# Patient Record
Sex: MALE | Race: WHITE | NOT HISPANIC OR LATINO
[De-identification: names, ages, dates, MRNs, and addresses within clinical notes are randomized per-mention and may not be internally consistent; named-entity substitution may affect disease eponyms.]

---

## 2022-06-24 ENCOUNTER — APPOINTMENT (OUTPATIENT)
Dept: ORTHOPEDIC SURGERY | Facility: CLINIC | Age: 77
End: 2022-06-24

## 2022-06-24 VITALS — WEIGHT: 193 LBS | BODY MASS INDEX: 25.3 KG/M2 | HEIGHT: 73.25 IN

## 2022-06-24 DIAGNOSIS — M70.51 OTHER BURSITIS OF KNEE, RIGHT KNEE: ICD-10-CM

## 2022-06-24 PROCEDURE — 20611 DRAIN/INJ JOINT/BURSA W/US: CPT | Mod: 50

## 2022-06-24 PROCEDURE — 99214 OFFICE O/P EST MOD 30 MIN: CPT | Mod: 25

## 2022-06-24 NOTE — HISTORY OF PRESENT ILLNESS
[Gradual] : gradual [Localized] : localized [Tightness] : tightness [Rest] : rest [Retired] : Work status: retired [de-identified] : has known OA and did well with euflexxxa, no injury, uses OTCs, did more house chores and has R knee medial pain, no swelling [] : no [FreeTextEntry1] : knees [FreeTextEntry5] : left knee is a 2 level of pain\par rt knee is a 5 or 6 level of pain [de-identified] : certain motions  [de-identified] :  [de-identified] : pt would like to start off with cortiosne then gel injections

## 2022-06-24 NOTE — DISCUSSION/SUMMARY
[de-identified] : Patient allowed to gently start resuming activities.\par Discussed change to medication prescription and usage. \par Offered cortisone steroid injection. \par Bracing options discussed with patient. \par Hyaluronic Acid inj pamphlet given to pt.\par

## 2022-07-15 ENCOUNTER — APPOINTMENT (OUTPATIENT)
Dept: ORTHOPEDIC SURGERY | Facility: CLINIC | Age: 77
End: 2022-07-15

## 2022-07-15 VITALS — HEIGHT: 73.25 IN | BODY MASS INDEX: 25.3 KG/M2 | WEIGHT: 193 LBS

## 2022-07-15 DIAGNOSIS — Z78.9 OTHER SPECIFIED HEALTH STATUS: ICD-10-CM

## 2022-07-15 DIAGNOSIS — M19.90 UNSPECIFIED OSTEOARTHRITIS, UNSPECIFIED SITE: ICD-10-CM

## 2022-07-15 PROCEDURE — 20611 DRAIN/INJ JOINT/BURSA W/US: CPT | Mod: 50

## 2022-07-15 PROCEDURE — 99212 OFFICE O/P EST SF 10 MIN: CPT | Mod: 25

## 2022-07-15 NOTE — DISCUSSION/SUMMARY
[de-identified] : Patient allowed to gently start resuming activities.\par Discussed change to medication prescription and usage. \par Offered cortisone steroid injection. \par Bracing options discussed with patient. \par Hyaluronic Acid inj pamphlet given to pt.\par

## 2022-07-15 NOTE — PROCEDURE
[FreeTextEntry3] : Euflexxa (Large Joint) with Ultrasound Guidance\par Viscosupplementation Injection: X-ray evidence of Osteoarthritis on this or prior visit and Patient has tried OTC's including aspirin, Ibuprofen, Aleve etc or prescription NSAIDS, and/or exercises at home and/ or physical therapy without satisfactory response. \par An injection of Euflexxa 2ml _#_1_ was injected into the bilateral knee(s). after verbal consent using sterile technique. The risks, benefits, and alternatives to Viscosupplementation injection were explained in full to the patient. Risks outlined include but are not limited to infection, sepsis, bleeding, scarring, skin discoloration, temporary increase in pain, syncopal episode, failure to resolve symptoms, allergic reaction, and symptom recurrence. Signs and symptoms of infection reviewed and patient advised to call immediately for redness, fevers, and/or chills. Patient understood the risks. All questions were answered. After discussion of options, patient requested Viscosupplementation. Oral informed consent was obtained and sterile prep was done of the injection site. Sterile technique was used without complications. The patient tolerated the procedure well. Ice tonight to the injection site. \par \par Ultrasound Guidance was used for the following reasons: altered anatomic landmarks because of erosive arthritis. \par \par Ultrasound guided injection was performed of the knee, visualization of the needle and placement of injection was performed without complication.

## 2022-07-15 NOTE — HISTORY OF PRESENT ILLNESS
[Gradual] : gradual [Rest] : rest [Retired] : Work status: retired [4] : 4 [Burning] : burning [Radiating] : radiating [de-identified] : Patient returns for Euflexxa #1 bilateral knees, has completed in the past with help. [] : no [FreeTextEntry1] : knees [FreeTextEntry6] : soreness [FreeTextEntry7] : l hip/groin [FreeTextEntry9] : Zilretta [de-identified] : certain motions  [de-identified] :

## 2022-07-19 ENCOUNTER — FORM ENCOUNTER (OUTPATIENT)
Age: 77
End: 2022-07-19

## 2022-07-20 ENCOUNTER — APPOINTMENT (OUTPATIENT)
Dept: CT IMAGING | Facility: CLINIC | Age: 77
End: 2022-07-20

## 2022-07-20 ENCOUNTER — APPOINTMENT (OUTPATIENT)
Dept: ORTHOPEDIC SURGERY | Facility: CLINIC | Age: 77
End: 2022-07-20

## 2022-07-20 VITALS — WEIGHT: 193 LBS | BODY MASS INDEX: 25.58 KG/M2 | HEIGHT: 73 IN

## 2022-07-20 PROCEDURE — 73700 CT LOWER EXTREMITY W/O DYE: CPT | Mod: LT,MH

## 2022-07-20 PROCEDURE — 76376 3D RENDER W/INTRP POSTPROCES: CPT | Mod: LT

## 2022-07-20 PROCEDURE — 73502 X-RAY EXAM HIP UNI 2-3 VIEWS: CPT

## 2022-07-20 PROCEDURE — 99214 OFFICE O/P EST MOD 30 MIN: CPT

## 2022-07-20 NOTE — ASSESSMENT
[FreeTextEntry1] : 76M p/w L painful SALINA\par \par f/u CT hip\par f/u esr crp cbc\par return after imaging\par \par The patient was advised of the diagnosis. The natural history of the pathology was explained in full to the patient in layman's terms. All questions were answered. The risks and benefits of surgical and non-surgical treatment alternatives were explained in full to the patient. \par

## 2022-07-20 NOTE — HISTORY OF PRESENT ILLNESS
[4] : 4 [3] : 3 [Dull/Aching] : dull/aching [Localized] : localized [Rest] : rest [Walking] : walking [Exercising] : exercising [Stairs] : stairs [de-identified] : 77 y/o M presenting for left hip pain X 3 weeks. History of femur/pelvic fracture in 2015 that required surgical fixation/SALINA at [facility]. Has pain into groin. Has not taken anything for the pain. Not doing PT for the hip at this time. States was working the yard  when the pain began. Most of the pain is contained to the groin.  [] : Post Surgical Visit: no [FreeTextEntry1] : left hip [FreeTextEntry5] : Left hip pain for the past ~4 weeks. history of left SALINA done Oct 2015 [FreeTextEntry6] : groin and outer hip [de-identified] : Oct 2015

## 2022-07-20 NOTE — IMAGING
[de-identified] : LEFT HIP\par Range of Motion\par Hip: Flexion/extension/ER/IR     / EX 20/ ER 45/ IR 20 / AB 60 /AD 20\par Impingement with flexion adduction and internal rotation: Positive\par Contracture: none\par Snapping hip: negative\par Greater trochanter: no tenderness\par \par Neurovascular\par Distal extremities: warm to touch\par Sensation to light touch: intact\par Muscle strength: 5/5\par  [Left] : left hip with pelvis [All Views] : anteroposterior, lateral [No loss of surgical correlation. Bony alignment acceptable. Hardware in appropriate position] : No loss of surgical correlation. Bony alignment acceptable. Hardware in appropriate position

## 2022-07-22 ENCOUNTER — APPOINTMENT (OUTPATIENT)
Dept: ORTHOPEDIC SURGERY | Facility: CLINIC | Age: 77
End: 2022-07-22

## 2022-07-22 VITALS — HEIGHT: 73 IN | WEIGHT: 193 LBS | BODY MASS INDEX: 25.58 KG/M2

## 2022-07-22 PROCEDURE — 99024 POSTOP FOLLOW-UP VISIT: CPT

## 2022-07-22 PROCEDURE — 20611 DRAIN/INJ JOINT/BURSA W/US: CPT | Mod: 50

## 2022-07-22 NOTE — HISTORY OF PRESENT ILLNESS
[Gradual] : gradual [4] : 4 [Burning] : burning [Radiating] : radiating [Rest] : rest [Retired] : Work status: retired [2] : 2 [Euflexxa] : Euflexxa [de-identified] : Patient returns for Euflexxa #2 bilateral knees, has completed in the past with help. [] : no [FreeTextEntry1] : knees [FreeTextEntry6] : soreness [FreeTextEntry7] : l hip/groin [FreeTextEntry9] : Zilretta [de-identified] : certain motions  [de-identified] :  [de-identified] : 7-15-22 [de-identified] : knees

## 2022-07-22 NOTE — HISTORY OF PRESENT ILLNESS
[Gradual] : gradual [4] : 4 [Burning] : burning [Radiating] : radiating [Rest] : rest [Retired] : Work status: retired [2] : 2 [Euflexxa] : Euflexxa [de-identified] : Patient returns for Euflexxa #2 bilateral knees, has completed in the past with help. [] : no [FreeTextEntry1] : knees [FreeTextEntry6] : soreness [FreeTextEntry7] : l hip/groin [FreeTextEntry9] : Zilretta [de-identified] : certain motions  [de-identified] :  [de-identified] : 7-15-22 [de-identified] : knees

## 2022-07-22 NOTE — PROCEDURE
[FreeTextEntry3] : Euflexxa (Large Joint) with Ultrasound Guidance\par Viscosupplementation Injection: X-ray evidence of Osteoarthritis on this or prior visit and Patient has tried OTC's including aspirin, Ibuprofen, Aleve etc or prescription NSAIDS, and/or exercises at home and/ or physical therapy without satisfactory response. \par An injection of Euflexxa 2ml _#_2_ was injected into the bilateral knee(s). after verbal consent using sterile technique. The risks, benefits, and alternatives to Viscosupplementation injection were explained in full to the patient. Risks outlined include but are not limited to infection, sepsis, bleeding, scarring, skin discoloration, temporary increase in pain, syncopal episode, failure to resolve symptoms, allergic reaction, and symptom recurrence. Signs and symptoms of infection reviewed and patient advised to call immediately for redness, fevers, and/or chills. Patient understood the risks. All questions were answered. After discussion of options, patient requested Viscosupplementation. Oral informed consent was obtained and sterile prep was done of the injection site. Sterile technique was used without complications. The patient tolerated the procedure well. Ice tonight to the injection site. \par \par Ultrasound Guidance was used for the following reasons: altered anatomic landmarks because of erosive arthritis. \par \par Ultrasound guided injection was performed of the knee, visualization of the needle and placement of injection was performed without complication.

## 2022-07-28 ENCOUNTER — APPOINTMENT (OUTPATIENT)
Dept: ORTHOPEDIC SURGERY | Facility: CLINIC | Age: 77
End: 2022-07-28

## 2022-07-28 VITALS — BODY MASS INDEX: 25.58 KG/M2 | HEIGHT: 73 IN | WEIGHT: 193 LBS

## 2022-07-28 PROCEDURE — 99214 OFFICE O/P EST MOD 30 MIN: CPT

## 2022-07-28 NOTE — HISTORY OF PRESENT ILLNESS
[4] : 4 [3] : 3 [Dull/Aching] : dull/aching [Localized] : localized [Rest] : rest [Walking] : walking [Exercising] : exercising [Stairs] : stairs [de-identified] : 7/28/22: f/u L hip CT, symptoms about the same\par \par 75 y/o M presenting for left hip pain X 3 weeks. History of femur/pelvic fracture in 2015 that required surgical fixation/SALINA at [facility]. Has pain into groin. Has not taken anything for the pain. Not doing PT for the hip at this time. States was working the yard  when the pain began. Most of the pain is contained to the groin.  [] : Post Surgical Visit: no [FreeTextEntry1] : left hip [FreeTextEntry5] : Left hip pain for the past ~4 weeks. history of left SALINA done Oct 2015 [FreeTextEntry6] : groin and outer hip [de-identified] : Oct 2015

## 2022-07-28 NOTE — IMAGING
[de-identified] : LEFT HIP\par Range of Motion\par Hip: Flexion/extension/ER/IR     / EX 20/ ER 45/ IR 20 / AB 60 /AD 20\par Impingement with flexion adduction and internal rotation: Positive\par Contracture: none\par Snapping hip: negative\par Greater trochanter: no tenderness\par \par Neurovascular\par Distal extremities: warm to touch\par Sensation to light touch: intact\par Muscle strength: 5/5\par

## 2022-07-28 NOTE — ASSESSMENT
[FreeTextEntry1] : 76M p/w L iliopsoas bursitis\par \par f/u L psoas bursa aspiration/injection\par return to office afterwards\par \par The patient was advised of the diagnosis. The natural history of the pathology was explained in full to the patient in layman's terms. All questions were answered. The risks and benefits of surgical and non-surgical treatment alternatives were explained in full to the patient. \par

## 2022-07-29 ENCOUNTER — APPOINTMENT (OUTPATIENT)
Dept: ORTHOPEDIC SURGERY | Facility: CLINIC | Age: 77
End: 2022-07-29

## 2022-07-29 PROCEDURE — 20611 DRAIN/INJ JOINT/BURSA W/US: CPT | Mod: LT

## 2022-07-29 PROCEDURE — 99024 POSTOP FOLLOW-UP VISIT: CPT

## 2022-07-29 NOTE — PROCEDURE
[FreeTextEntry3] : Euflexxa (Large Joint) with Ultrasound Guidance\par Viscosupplementation Injection: X-ray evidence of Osteoarthritis on this or prior visit and Patient has tried OTC's including aspirin, Ibuprofen, Aleve etc or prescription NSAIDS, and/or exercises at home and/ or physical therapy without satisfactory response. \par An injection of Euflexxa 2ml #3 was injected into the bilat knee(s). after verbal consent using sterile technique. The risks, benefits, and alternatives to Viscosupplementation injection were explained in full to the patient. Risks outlined include but are not limited to infection, sepsis, bleeding, scarring, skin discoloration, temporary increase in pain, syncopal episode, failure to resolve symptoms, allergic reaction, and symptom recurrence. Signs and symptoms of infection reviewed and patient advised to call immediately for redness, fevers, and/or chills. Patient understood the risks. All questions were answered. After discussion of options, patient requested Viscosupplementation. Oral informed consent was obtained and sterile prep was done of the injection site. Sterile technique was used without complications. The patient tolerated the procedure well. Ice tonight to the injection site. \par \par Ultrasound Guidance was used for the following reasons: altered anatomic landmarks because of erosive arthritis. \par \par Ultrasound guided injection was performed of the knee, visualization of the needle and placement of injection was performed without complication

## 2022-07-29 NOTE — HISTORY OF PRESENT ILLNESS
[de-identified] : Patient returns for Euflexxa 3 bilateral knees, has completed in the past with help.

## 2022-08-01 ENCOUNTER — RESULT REVIEW (OUTPATIENT)
Age: 77
End: 2022-08-01

## 2022-08-11 ENCOUNTER — APPOINTMENT (OUTPATIENT)
Dept: ORTHOPEDIC SURGERY | Facility: CLINIC | Age: 77
End: 2022-08-11

## 2022-08-11 VITALS — HEIGHT: 73 IN | BODY MASS INDEX: 25.58 KG/M2 | WEIGHT: 193 LBS

## 2022-08-11 PROCEDURE — 99214 OFFICE O/P EST MOD 30 MIN: CPT

## 2022-08-11 NOTE — ASSESSMENT
[FreeTextEntry1] : 76M p/w L iliopsoas bursitis, resolving\par \par Begin PT\par return 6-8 weeks\par \par The patient was advised of the diagnosis. The natural history of the pathology was explained in full to the patient in layman's terms. All questions were answered. The risks and benefits of surgical and non-surgical treatment alternatives were explained in full to the patient. \par

## 2022-08-11 NOTE — HISTORY OF PRESENT ILLNESS
[4] : 4 [3] : 3 [Dull/Aching] : dull/aching [Localized] : localized [Rest] : rest [Walking] : walking [Exercising] : exercising [Stairs] : stairs [de-identified] : 8/11/22 f/u L hip s/p bursal aspiration, groin pain has improved considerably\par 7/28/22: f/u L hip CT, symptoms about the same\par \par 75 y/o M presenting for left hip pain X 3 weeks. History of femur/pelvic fracture in 2015 that required surgical fixation/SALINA at [facility]. Has pain into groin. Has not taken anything for the pain. Not doing PT for the hip at this time. States was working the yard  when the pain began. Most of the pain is contained to the groin.  [] : Post Surgical Visit: no [FreeTextEntry1] : left hip [FreeTextEntry5] : Left hip pain for the past ~4 weeks. history of left SALINA done Oct 2015 [FreeTextEntry6] : groin and outer hip [de-identified] : Oct 2015

## 2022-08-11 NOTE — IMAGING
[de-identified] : LEFT HIP\par Range of Motion\par Hip: Flexion/extension/ER/IR     / EX 20/ ER 45/ IR 20 / AB 60 /AD 20\par Impingement with flexion adduction and internal rotation: Positive\par Contracture: none\par Snapping hip: negative\par Greater trochanter: no tenderness\par \par Neurovascular\par Distal extremities: warm to touch\par Sensation to light touch: intact\par Muscle strength: 5/5\par

## 2022-09-19 ENCOUNTER — APPOINTMENT (OUTPATIENT)
Dept: ORTHOPEDIC SURGERY | Facility: CLINIC | Age: 77
End: 2022-09-19

## 2022-09-19 PROCEDURE — 99213 OFFICE O/P EST LOW 20 MIN: CPT

## 2022-09-19 NOTE — IMAGING
[de-identified] : left hip: \par lateral tenderness\par greater troch tenderness\par 5/5\par NVI\par

## 2022-09-19 NOTE — HISTORY OF PRESENT ILLNESS
[5] : 5 [Dull/Aching] : dull/aching [Household chores] : household chores [Leisure] : leisure [Sleep] : sleep [Rest] : rest [Standing] : standing [Walking] : walking [Bending forward] : bending forward [Extending back] : extending back [de-identified] : 9/19/22: Patient has been seeing Dr. José with left hip pain (SALINA 2015)  and fluid collection. He had a recent asp/inj that has helped and provided significant relief but continues to have some lateral sided pain. He has been doing PT with little benefit.  [] : no [de-identified] : dr spicer  [de-identified] : XRAY AND CT

## 2022-09-19 NOTE — DISCUSSION/SUMMARY
[de-identified] : The patient was advised of the diagnosis.  The natural history of the pathology was explained in full to the patient in layman's terms. All questions were answered.  The risks and benefits of surgical and non-surgical treatment alternatives were explained in full to the patient.\par \par Entered by Nancy Gabriel acting as scribe.\par

## 2022-09-19 NOTE — ASSESSMENT
[FreeTextEntry1] : 9/19/22: XR and CT scan showing good position of implant with resolving psoas fluid collection after asp/inj. Today mostly lateral sided pain that will likely resolve. Continue PT for this.

## 2022-10-06 ENCOUNTER — APPOINTMENT (OUTPATIENT)
Dept: ORTHOPEDIC SURGERY | Facility: CLINIC | Age: 77
End: 2022-10-06

## 2023-02-10 ENCOUNTER — APPOINTMENT (OUTPATIENT)
Dept: ORTHOPEDIC SURGERY | Facility: CLINIC | Age: 78
End: 2023-02-10
Payer: MEDICARE

## 2023-02-10 VITALS — HEIGHT: 73 IN | BODY MASS INDEX: 25.58 KG/M2 | WEIGHT: 193 LBS

## 2023-02-10 PROCEDURE — 20611 DRAIN/INJ JOINT/BURSA W/US: CPT | Mod: 50

## 2023-02-10 PROCEDURE — 99213 OFFICE O/P EST LOW 20 MIN: CPT | Mod: 25

## 2023-02-10 NOTE — DISCUSSION/SUMMARY
[de-identified] : Rest, ice, activity modification\par Patient allowed to gently start resuming activities.\par Discussed change to medication prescription and usage. \par Offered cortisone steroid injection. \par

## 2023-02-10 NOTE — HISTORY OF PRESENT ILLNESS
[4] : 4 [2] : 2 [Burning] : burning [Localized] : localized [Household chores] : household chores [de-identified] : Patient completed  Euflexxa 3 bilateral knees, July 29 with help until now worse when more active [] : no [FreeTextEntry1] : knees; right knee worse [FreeTextEntry6] : rt knee [FreeTextEntry9] : using bike, stretching quads [de-identified] : over use

## 2023-02-10 NOTE — PROCEDURE
[FreeTextEntry3] : Large Joint Injection / Aspiration: Lidocaine, Zilretta Ultrasound and Guidance\par Lidocaine: 3 cc. Needle size: 18 gauge , 1.5 inch. \par Zilretta: An Injection of Zilretta 32 Units 5ml , \par was injected into bilateral knees . \par \par Ultrasound Guidance was used\par Ultrasound guided injection was performed of the knee, visualization of the needle and placement of injection was performed without complication. \par \par Large Joint Injection was performed because of pain and inflammation. Anesthesia: ethyl chloride sprayed topically.. Patient has tried OTC's including aspirin, Ibuprofen, Aleve etc or prescription NSAIDS, and/or exercises at home and/ or physical therapy without satisfactory response. After verbal consent using sterile preparation and technique. The risks, benefits, and alternatives to aspiration were explained in full to the patient. Risks outlined include but are not limited to infection, sepsis, bleeding, scarring, skin discoloration, temporary increase in pain, syncopal episode, failure to resolve symptoms, allergic reaction and symptom recurrence. Patient understood the risks. All questions were answered. After discussion of options, patient requested an aspiration. Oral informed consent was obtained and sterile prep was done of the injection site. Sterile technique was utilized for the procedure including the preparation of the solutions used for the aspiration. Patient tolerated the procedure well. Advised to ice the injection site this evening. Prep with betadine locally to site. Sterile technique used. Patient tolerated procedure well. Post Procedure Instructions: Patient was advised to call if redness, pain, or fever occur and apply ice for 15 min. out of every hour for the next 12-24 hours as tolerated. patient was advised to rest the joint(s) for 1 days.

## 2023-02-10 NOTE — PHYSICAL EXAM
[Bilateral] : knee bilaterally [] : no effusion [Negative] : negative Joe's [TWNoteComboBox7] : flexion 120 degrees

## 2023-02-10 NOTE — REVIEW OF SYSTEMS
[Joint Pain] : joint pain [Negative] : Heme/Lymph [Joint Stiffness] : joint stiffness [FreeTextEntry9] : rt knee

## 2023-03-23 ENCOUNTER — APPOINTMENT (OUTPATIENT)
Dept: ORTHOPEDIC SURGERY | Facility: CLINIC | Age: 78
End: 2023-03-23
Payer: MEDICARE

## 2023-03-23 VITALS — HEIGHT: 73 IN | BODY MASS INDEX: 25.58 KG/M2 | WEIGHT: 193 LBS

## 2023-03-23 DIAGNOSIS — M76.12 PSOAS TENDINITIS, LEFT HIP: ICD-10-CM

## 2023-03-23 DIAGNOSIS — Z96.642 PRESENCE OF LEFT ARTIFICIAL HIP JOINT: ICD-10-CM

## 2023-03-23 PROCEDURE — 99214 OFFICE O/P EST MOD 30 MIN: CPT

## 2023-03-23 PROCEDURE — 73502 X-RAY EXAM HIP UNI 2-3 VIEWS: CPT

## 2023-03-23 PROCEDURE — 72100 X-RAY EXAM L-S SPINE 2/3 VWS: CPT

## 2023-03-23 NOTE — IMAGING
[de-identified] : LEFT HIP\par Range of Motion\par Hip: Flexion/extension/ER/IR     / EX 20/ ER 45/ IR 20 / AB 60 /AD 20\par Impingement with flexion adduction and internal rotation: Positive\par Contracture: none\par Snapping hip: negative\par Greater trochanter: no tenderness\par \par Neurovascular\par Distal extremities: warm to touch\par Sensation to light touch: intact\par Muscle strength: 5/5\par

## 2023-03-23 NOTE — PHYSICAL EXAM
[] : buttock tenderness [Facet arthropathy] : Facet arthropathy [Disc space narrowing] : Disc space narrowing [Scoliosis] : Scoliosis [Left] : left hip with pelvis [There are no fractures, subluxations or dislocations. No significant abnormalities are seen] : There are no fractures, subluxations or dislocations. No significant abnormalities are seen [No loss of surgical correlation. Bony alignment acceptable. Hardware in appropriate position] : No loss of surgical correlation. Bony alignment acceptable. Hardware in appropriate position

## 2023-03-23 NOTE — HISTORY OF PRESENT ILLNESS
[8] : 8 [6] : 6 [de-identified] : 3/23/23: Here for f/up L hip. States he had hip/groin/buttock pain for the past 6 days with no injury. Denies radic, N/T, weakness, b/b incontinence. He had been doing well otherwise.\par \par 8/11/22 f/u L hip s/p bursal aspiration, groin pain has improved considerably\par 7/28/22: f/u L hip CT, symptoms about the same\par 77 y/o M presenting for left hip pain X 3 weeks. History of femur/pelvic fracture in 2015 that required surgical fixation/SALINA at [facility]. Has pain into groin. Has not taken anything for the pain. Not doing PT for the hip at this time. States was working the yard  when the pain began. Most of the pain is contained to the groin.  [FreeTextEntry1] : Left Hip  [de-identified] : PT

## 2023-03-23 NOTE — ASSESSMENT
[FreeTextEntry1] : 76M p/w L iliopsoas bursitis, lumbar DDD\par \par Begin PT\par return 6-8 weeks\par \par The patient was advised of the diagnosis. The natural history of the pathology was explained in full to the patient in layman's terms. All questions were answered. The risks and benefits of surgical and non-surgical treatment alternatives were explained in full to the patient. \par

## 2023-03-24 ENCOUNTER — APPOINTMENT (OUTPATIENT)
Dept: ORTHOPEDIC SURGERY | Facility: CLINIC | Age: 78
End: 2023-03-24
Payer: MEDICARE

## 2023-03-24 VITALS — HEIGHT: 73 IN | BODY MASS INDEX: 25.58 KG/M2 | WEIGHT: 193 LBS

## 2023-03-24 PROCEDURE — 20611 DRAIN/INJ JOINT/BURSA W/US: CPT | Mod: 50

## 2023-03-24 NOTE — PROCEDURE
[FreeTextEntry3] : Euflexxa (Large Joint) with Ultrasound Guidance\par Viscosupplementation Injection: X-ray evidence of Osteoarthritis on this or prior visit and Patient has tried OTC's including aspirin, Ibuprofen, Aleve etc or prescription NSAIDS, and/or exercises at home and/ or physical therapy without satisfactory response. \par An injection of Euflexxa 2ml _#_1__ was injected into the bilateral knee(s). after verbal consent using sterile technique. The risks, benefits, and alternatives to Viscosupplementation injection were explained in full to the patient. Risks outlined include but are not limited to infection, sepsis, bleeding, scarring, skin discoloration, temporary increase in pain, syncopal episode, failure to resolve symptoms, allergic reaction, and symptom recurrence. Signs and symptoms of infection reviewed and patient advised to call immediately for redness, fevers, and/or chills. Patient understood the risks. All questions were answered. After discussion of options, patient requested Viscosupplementation. Oral informed consent was obtained and sterile prep was done of the injection site. Sterile technique was used without complications. The patient tolerated the procedure well. Ice tonight to the injection site. \par \par Ultrasound Guidance was used for the following reasons: altered anatomic landmarks because of erosive arthritis. \par \par Ultrasound guided injection was performed of the knee, visualization of the needle and placement of injection was performed without complication.

## 2023-03-24 NOTE — PHYSICAL EXAM
[Bilateral] : knee bilaterally [Negative] : negative Joe's [] : no effusion [TWNoteComboBox7] : flexion 120 degrees

## 2023-03-24 NOTE — DISCUSSION/SUMMARY
[de-identified] : Rest, ice, activity modification\par Patient allowed to gently start resuming activities.\par Discussed change to medication prescription and usage. \par Offered cortisone steroid injection. \par

## 2023-03-24 NOTE — HISTORY OF PRESENT ILLNESS
[Radiating] : radiating [Injection therapy] : injection therapy [Walking] : walking [de-identified] : Patient completed  Euflexxa 3 bilateral knees, July 29 with help until now worse when more active. Returns to start the series again, Juliette was helpful. [] : no [FreeTextEntry1] : knees [FreeTextEntry5] : left knee better is a 1 pain level\par rt knee is a 3 pain level [FreeTextEntry7] : left hip [FreeTextEntry9] : HEP, bike [de-identified] : giovanni

## 2023-03-31 ENCOUNTER — APPOINTMENT (OUTPATIENT)
Dept: ORTHOPEDIC SURGERY | Facility: CLINIC | Age: 78
End: 2023-03-31
Payer: MEDICARE

## 2023-03-31 VITALS — HEIGHT: 73 IN | BODY MASS INDEX: 25.58 KG/M2 | WEIGHT: 193 LBS

## 2023-03-31 PROCEDURE — 99024 POSTOP FOLLOW-UP VISIT: CPT

## 2023-03-31 PROCEDURE — 20611 DRAIN/INJ JOINT/BURSA W/US: CPT | Mod: 50

## 2023-03-31 NOTE — HISTORY OF PRESENT ILLNESS
[Euflexxa] : Euflexxa [3] : 3 [Radiating] : radiating [Household chores] : household chores [Meds] : meds [Injection therapy] : injection therapy [2] : 2 [de-identified] : Patient returns for Euflexxa #2 bilateral knees.  [] : no [FreeTextEntry1] : bilat knees [FreeTextEntry7] : hip [de-identified] : twiasting and over use [de-identified] : 3-24-23 [de-identified] : knees

## 2023-04-07 ENCOUNTER — APPOINTMENT (OUTPATIENT)
Dept: ORTHOPEDIC SURGERY | Facility: CLINIC | Age: 78
End: 2023-04-07
Payer: MEDICARE

## 2023-04-07 VITALS — HEIGHT: 73 IN | BODY MASS INDEX: 25.58 KG/M2 | WEIGHT: 193 LBS

## 2023-04-07 PROCEDURE — 20611 DRAIN/INJ JOINT/BURSA W/US: CPT | Mod: 50

## 2023-04-07 PROCEDURE — 99024 POSTOP FOLLOW-UP VISIT: CPT

## 2023-04-07 NOTE — PHYSICAL EXAM
[Bilateral] : knee bilaterally [Negative] : negative Joe's [] : mildly antalgic [TWNoteComboBox7] : flexion 120 degrees <<----- Click to add NO significant Past Surgical History

## 2023-04-07 NOTE — DISCUSSION/SUMMARY
[de-identified] : rest, ice, activity modification. \par \par Progress note completed by Ashely Ash PA-C

## 2023-04-07 NOTE — PROCEDURE
[FreeTextEntry3] : Euflexxa (Large Joint) with Ultrasound Guidance\par Viscosupplementation Injection: X-ray evidence of Osteoarthritis on this or prior visit and Patient has tried OTC's including aspirin, Ibuprofen, Aleve etc or prescription NSAIDS, and/or exercises at home and/ or physical therapy without satisfactory response. \par An injection of Euflexxa 2ml _#_3__ was injected into the bilateral knee(s). after verbal consent using sterile technique. The risks, benefits, and alternatives to Viscosupplementation injection were explained in full to the patient. Risks outlined include but are not limited to infection, sepsis, bleeding, scarring, skin discoloration, temporary increase in pain, syncopal episode, failure to resolve symptoms, allergic reaction, and symptom recurrence. Signs and symptoms of infection reviewed and patient advised to call immediately for redness, fevers, and/or chills. Patient understood the risks. All questions were answered. After discussion of options, patient requested Viscosupplementation. Oral informed consent was obtained and sterile prep was done of the injection site. Sterile technique was used without complications. The patient tolerated the procedure well. Ice tonight to the injection site. \par \par Ultrasound Guidance was used for the following reasons: altered anatomic landmarks because of erosive arthritis. \par \par Ultrasound guided injection was performed of the knee, visualization of the needle and placement of injection was performed without complication.

## 2023-04-07 NOTE — HISTORY OF PRESENT ILLNESS
[] : This patient has had an injection before: yes [3] : 3 [Euflexxa] : Euflexxa [de-identified] : Patient returns for Euflexxa #3 bilateral knees.  [FreeTextEntry1] : bilat knee [de-identified] : 3-31-23 [de-identified] : knees

## 2023-04-17 ENCOUNTER — APPOINTMENT (OUTPATIENT)
Dept: ORTHOPEDIC SURGERY | Facility: CLINIC | Age: 78
End: 2023-04-17

## 2023-05-04 ENCOUNTER — APPOINTMENT (OUTPATIENT)
Dept: ORTHOPEDIC SURGERY | Facility: CLINIC | Age: 78
End: 2023-05-04
Payer: MEDICARE

## 2023-05-04 ENCOUNTER — FORM ENCOUNTER (OUTPATIENT)
Age: 78
End: 2023-05-04

## 2023-05-04 VITALS — WEIGHT: 193 LBS | HEIGHT: 73 IN | BODY MASS INDEX: 25.58 KG/M2

## 2023-05-04 PROCEDURE — 99214 OFFICE O/P EST MOD 30 MIN: CPT

## 2023-05-04 NOTE — HISTORY OF PRESENT ILLNESS
[6] : 6 [3] : 3 [Localized] : localized [Sharp] : sharp [Rest] : rest [Meds] : meds [Bending forward] : bending forward [Retired] : Work status: retired [de-identified] : 5/4/23: still complaining of L buttock and low back pain, pain radiating to his left foot with numbness in his toes now\par \par 3/23/23: Here for f/up L hip. States he had hip/groin/buttock pain for the past 6 days with no injury. Denies radic, N/T, weakness, b/b incontinence. He had been doing well otherwise.\par 8/11/22 f/u L hip s/p bursal aspiration, groin pain has improved considerably\par 7/28/22: f/u L hip CT, symptoms about the same\par 75 y/o M presenting for left hip pain X 3 weeks. History of femur/pelvic fracture in 2015 that required surgical fixation/SALINA at [facility]. Has pain into groin. Has not taken anything for the pain. Not doing PT for the hip at this time. States was working the yard  when the pain began. Most of the pain is contained to the groin.  [] : no [FreeTextEntry1] : left hip  [de-identified] : prolonged activity  [de-identified] : P

## 2023-05-04 NOTE — PHYSICAL EXAM
[Facet arthropathy] : Facet arthropathy [Disc space narrowing] : Disc space narrowing [Scoliosis] : Scoliosis [] : buttock tenderness [Left] : left hip with pelvis [There are no fractures, subluxations or dislocations. No significant abnormalities are seen] : There are no fractures, subluxations or dislocations. No significant abnormalities are seen [No loss of surgical correlation. Bony alignment acceptable. Hardware in appropriate position] : No loss of surgical correlation. Bony alignment acceptable. Hardware in appropriate position

## 2023-05-04 NOTE — ASSESSMENT
[FreeTextEntry1] : 77M p/w L iliopsoas bursitis, lumbar DDD withr adiculopathy\par \par f/u L spine MRI\par tramadol for pain\par return after imaging\par \par The patient was advised of the diagnosis. The natural history of the pathology was explained in full to the patient in layman's terms. All questions were answered. The risks and benefits of surgical and non-surgical treatment alternatives were explained in full to the patient. \par  \par \par

## 2023-05-04 NOTE — IMAGING
[de-identified] : LEFT HIP\par Range of Motion\par Hip: Flexion/extension/ER/IR     / EX 20/ ER 45/ IR 20 / AB 60 /AD 20\par Impingement with flexion adduction and internal rotation: Positive\par Contracture: none\par Snapping hip: negative\par Greater trochanter: no tenderness\par \par Neurovascular\par Distal extremities: warm to touch\par Sensation to light touch: intact\par Muscle strength: 5/5\par

## 2023-05-05 ENCOUNTER — APPOINTMENT (OUTPATIENT)
Dept: MRI IMAGING | Facility: CLINIC | Age: 78
End: 2023-05-05
Payer: MEDICARE

## 2023-05-05 PROCEDURE — 72148 MRI LUMBAR SPINE W/O DYE: CPT | Mod: MH

## 2023-05-11 ENCOUNTER — APPOINTMENT (OUTPATIENT)
Dept: ORTHOPEDIC SURGERY | Facility: CLINIC | Age: 78
End: 2023-05-11
Payer: MEDICARE

## 2023-05-11 PROCEDURE — 99214 OFFICE O/P EST MOD 30 MIN: CPT

## 2023-05-11 NOTE — HISTORY OF PRESENT ILLNESS
[de-identified] : 5/11/22 f/u L spine MRI, pain improved with meloxicam\par 5/4/23: still complaining of L buttock and low back pain, pain radiating to his left foot with numbness in his toes now\par \par 3/23/23: Here for f/up L hip. States he had hip/groin/buttock pain for the past 6 days with no injury. Denies radic, N/T, weakness, b/b incontinence. He had been doing well otherwise.\par 8/11/22 f/u L hip s/p bursal aspiration, groin pain has improved considerably\par 7/28/22: f/u L hip CT, symptoms about the same\par 75 y/o M presenting for left hip pain X 3 weeks. History of femur/pelvic fracture in 2015 that required surgical fixation/S[facility] at [facility]. Has pain into groin. Has not taken anything for the pain. Not doing PT for the hip at this time. States was working the yard  when the pain began. Most of the pain is contained to the groin.  [FreeTextEntry1] : L-spine  [de-identified] : MRI

## 2023-05-11 NOTE — ASSESSMENT
[FreeTextEntry1] : 77M p/w L iliopsoas bursitis, lumbar DDD withr adiculopathy\par \par L spine MRI reviewed\par tramadol for pain\par rec f/u with Dr Truong re possible BETO\par \par The patient was advised of the diagnosis. The natural history of the pathology was explained in full to the patient in layman's terms. All questions were answered. The risks and benefits of surgical and non-surgical treatment alternatives were explained in full to the patient. \par  \par \par

## 2023-05-11 NOTE — DATA REVIEWED
[MRI] : MRI [Lumbar Spine] : lumbar spine [Report was reviewed and noted in the chart] : The report was reviewed and noted in the chart [I reviewed the films/CD and agree] : I reviewed the films/CD and agree [FreeTextEntry1] : Impression:\par 1. Scoliosis, exaggerated lumbar lordosis, and multilevel spondylolisthesis with multilevel degenerative disc disease and \par multiple disc herniations.\par 2. Mild central stenosis and bilateral foraminal narrowing at L1-L2, moderate central stenosis, bilateral exiting nerve root \par impingement, and severe left foraminal narrowing at L2-L3, impingement upon the right L4 and right greater than left \par exiting L3 nerve roots at L3-L4, moderate central stenosis with right greater than left L5 nerve root impingement and right \par greater than left exiting L4 nerve root impingement and severe right foraminal narrowing at L4-L5, and impingement \par upon the right exiting L5 nerve root at L5-S1.\par 3. Scattered degenerative endplate changes and anterior spondylosis without acute fracture.

## 2023-06-13 ENCOUNTER — APPOINTMENT (OUTPATIENT)
Dept: PAIN MANAGEMENT | Facility: CLINIC | Age: 78
End: 2023-06-13
Payer: MEDICARE

## 2023-06-13 VITALS — WEIGHT: 190 LBS | HEIGHT: 73 IN | BODY MASS INDEX: 25.18 KG/M2

## 2023-06-13 PROCEDURE — 99204 OFFICE O/P NEW MOD 45 MIN: CPT

## 2023-06-13 RX ORDER — TRAMADOL HYDROCHLORIDE 50 MG/1
50 TABLET, COATED ORAL
Qty: 30 | Refills: 0 | Status: DISCONTINUED | COMMUNITY
Start: 2023-05-04 | End: 2023-06-13

## 2023-06-13 NOTE — PHYSICAL EXAM
[de-identified] : Constitutional:  \par - No acute distress  \par - Well developed; well nourished  \par \par Neurological:  \par - normal mood and affect  \par - alert and oriented x 3   \par \par Cardiovascular:  \par - grossly normal \par \par Lumbar Spine Exam: \par \par Inspection: \par erythema (-) \par ecchymosis (-) \par rashes (-) \par alignment: Lumbar scoliosis \par \par Palpation: \par Midline lumbar tenderness:            (-) \par midline thoracic tenderness:          (-) \par Lumbar paraspinal tenderness:  L (-) ; R (-) \par thoracic paraspinal tenderness: L (-) ; R (-) \par sciatic nerve tenderness :          L (+) ; R (-) \par SI joint tenderness:                     L (+) ; R (-) \par GTB tenderness:                        L (-);  R (-) \par \par ROM: WNL \par pain with extremes of extension\par \par Strength: \par                                    Right       Left    \par Hip Flexion:                (5/5)       (5/5) \par Quadriceps:               (5/5)       (5/5) \par Hamstrings:                (5/5)       (5/5) \par Ankle Dorsiflexion:     (5/5)       (5/5) \par EHL:                           (5/5)       (5/5) \par Ankle Plantarflexion:  (5/5)       (5/5) \par \par Special Tests: \par SLR:                            R (+) ; L (-) \par Facet loading:             R (+) ; L (+) \par LEX test:                R (-) ; L (N/A) \par Hamstring tightness:   R (-);  L (-) \par \par Neurologic: \par SILT throughout right lower extremity \par SILT throughout left lower extremity \par \par Reflexes normal and symmetric bilateral lower extremities \par \par Gait: \par Mildly antalgic gait

## 2023-06-13 NOTE — ASSESSMENT
[FreeTextEntry1] : A discussion regarding available pain management treatment options occurred with the patient.  These included interventional, rehabilitative, pharmacological, and alternative modalities. We will proceed with the following:  \par \par Interventional treatment options:  \par - Proceed with left PM L3-L4 LESI with fluoroscopic guidance\par - Discussed at length realistic goals for indicated procedure\par - Can consider lumbar facet directed intervention for ongoing axial low back pain\par - see additional instructions below  \par \par Rehabilitative options:  \par - initiate trial of physical therapy\par - participation in active HEP was discussed  \par \par Medication based treatment options:  \par - continue meloxicam 7.5-15 mg daily\par - see additional instructions below  \par \par Complementary treatment options:  \par - Weight management and lifestyle modifications discussed  \par - See additional instructions below  \par \par Additional treatment recommendations as follows:  \par - Follow up 1-2 weeks post injection for assessment of efficacy and further treatment recommendations\par \par We have discussed the risks, benefits, and alternatives NSAID therapy including but not limited to the risk of bleeding, thrombosis, gastric mucosal irritation/ulceration, allergic reaction and kidney dysfunction; the patient verbalizes an understanding.\par \par The risks, benefits and alternatives of the proposed procedure were explained in detail with the patient. The risks outlined include but are not limited to infection, bleeding, post- dural puncture headache, nerve injury, a temporary increase in pain, failure to resolve symptoms, allergic reaction, and possible elevation of blood sugar in diabetics if using corticosteroid.  All questions were answered to patient's apparent satisfaction and he/she verbalized an understanding.\par \par The documentation recorded by the scribe, in my presence, accurately reflects the service I personally performed and the decisions made by me with my edits as appropriate. \par \par I, Moi Junior acting as scribe, attest that this documentation has been prepared under the direction and in the presence of Provider David Truong DO.

## 2023-06-13 NOTE — HISTORY OF PRESENT ILLNESS
[Lower back] : lower back [Sudden] : sudden [6] : 6 [5] : 5 [Dull/Aching] : dull/aching [Intermittent] : intermittent [Leisure] : leisure [Social interactions] : social interactions [Sitting] : sitting [FreeTextEntry1] : The patient presents for initial evaluation regarding their low back pain.   Patient was referred by Dr. José.  Patient with longstanding history of chronic low back pain which she attributes to years of physical labor.  He reports about 2 months ago the patient was digging in the garden, and woke up the next day with excruciating lower back pain, and swelling.  There was associated ecchymosis in the low back with radiation into the left buttocks.  His pain is in the left side of his lower back with radicular pain shooting down his entire left leg into the foot and toes; he has done no conservative therapy since the flare-up but has done PT in the past for his lower back.\par \par Subjective weakness: No \par Lower extremity paresthesias: No \par Bladder/bowel dysfunction: No \par \par Injections: No \par \par Pertinent Surgical History: \par 1) Left THR\par \par Imaging: \par 1) MRI Lumbar Spine (5/5/2023) - [facility]\par \par Findings: There is dextrolevoscoliosis. There are postoperative changes at the left hip. There is exaggerated \par mid-to-lower lumbar lordosis. There is anterolisthesis measuring 6-7 mm at L5-S1 and slight retrolisthesis at \par L2-L3, L3-L4, and L4-L5. There is multilevel disc desiccation, loss of disc height, and scattered degenerative \par endplate changes with anterior spondylosis most prominent on the left anteriorly at L2-L3 and on the right \par anteriorly at L4-L5. There is no acute vertebral body fracture. The conus appears unremarkable. There are no \par gross paravertebral soft tissue masses.\par L1-L2: There is diffuse disc bulging, bony ridging, and prominent facet hypertrophy posteriorly contributing to \par mild central stenosis in the posterior aspect of the canal centrally with mild bilateral foraminal narrowing.\par L2-L3: There is disc bulging, bony ridging, facet arthrosis, broad-based posterior disc herniation, moderate \par central stenosis, and bilateral exiting nerve root impingement with severe left foraminal narrowing.\par L3-L4: There is disc bulging, bony ridging, facet arthrosis, broad-based posterior disc herniation, right L4 nerve \par root impingement, and right greater than left exiting L3 nerve root impingement.\par L4-L5: There is disc bulging, bony ridging, facet arthrosis, broad-based posterior disc herniation, moderate \par central stenosis, impingement upon bilateral L5 nerve roots right greater than left, and impingement upon right greater than left exiting L4 nerve roots with severe right foraminal narrowing.\par L5-S1: There is disc bulging, bony ridging, facet arthrosis, and impingement upon the right exiting L5 nerve root with right greater than left foraminal narrowing.  \par \par Physician Disclaimer: I have personally reviewed and confirmed all HPI data with the patient.  [] : Patient is currently injured and not playing sports: no [FreeTextEntry7] : buttock, left hip [FreeTextEntry8] : sitting [de-identified] : lumbar mri at [facility]

## 2023-06-28 ENCOUNTER — APPOINTMENT (OUTPATIENT)
Dept: PAIN MANAGEMENT | Facility: CLINIC | Age: 78
End: 2023-06-28

## 2023-07-18 ENCOUNTER — APPOINTMENT (OUTPATIENT)
Dept: PAIN MANAGEMENT | Facility: CLINIC | Age: 78
End: 2023-07-18

## 2023-07-19 ENCOUNTER — APPOINTMENT (OUTPATIENT)
Dept: PAIN MANAGEMENT | Facility: CLINIC | Age: 78
End: 2023-07-19
Payer: MEDICARE

## 2023-07-19 PROCEDURE — 62323 NJX INTERLAMINAR LMBR/SAC: CPT

## 2023-07-19 NOTE — PROCEDURE
[FreeTextEntry3] : Date of Service: 07/19/2023 \par \par Account: [id number]\par \par Patient: TRISTAN SMITH \par \par YOB: 1945\par \par Age: 77 year\par \par Surgeon:      David Truong DO\par \par Assistant:    None\par \par Pre-Operative Diagnosis:         Lumbosacral Radiculitis (M54.17)\par \par Post Operative Diagnosis:        Lumbosacral Radiculitis (M54.17)   \par \par Procedure:             Lumbar interlaminar (L3-L4) epidural steroid injection under fluoroscopic guidance\par \par Anesthesia:            MAC\par \par This procedure was carried out using fluoroscopic guidance.  The risks and benefits of the procedure were discussed extensively with the patient.  The consent of the patient was obtained and the following procedure was performed.  A timeout was performed with all essential staff present and the site and side were verified.\par \par The patient was placed in the prone position with a pillow under the abdomen to minimize the lumbar lordosis.  The lumbar area was prepped and draped in a sterile fashion.  Under A/P view with slight cephalad-caudad angulation, the L3-L4 interspace was identified and marked.  Using sterile technique the superficial skin was anesthetized with 1% Lidocaine.  A 20 gauge Tuohy needle was advanced into the epidural space under fluoroscopy using loss of resistance at the L3-L4 level.  After negative aspiration for heme or CSF, an epidurogram was obtained in the A/P and lateral fluoroscopic views using 2-3 cc of Omnipaque contrast confirming epidural placement of the needle.  After this, 5 cc of of a mixture of preservative free normal saline and 40 mg of Kenalog were injected into the epidural space. \par \par Vital signs remained normal throughout the procedure.  The patient tolerated the procedure well.  There were no immediate complications from the performed procedure.  The patient was instructed to apply ice over the injection sites for twenty minutes every two hours for the next 24 hours.\par \par Disposition:\par 1. The patient was advised to F/U in 1-2 weeks to assess the response to the injection.\par 2. The patient was also instructed to contact me immediately if there were any concerns related to the procedure performed.

## 2023-08-01 ENCOUNTER — APPOINTMENT (OUTPATIENT)
Dept: PAIN MANAGEMENT | Facility: CLINIC | Age: 78
End: 2023-08-01

## 2023-08-08 ENCOUNTER — APPOINTMENT (OUTPATIENT)
Dept: PAIN MANAGEMENT | Facility: CLINIC | Age: 78
End: 2023-08-08

## 2023-09-08 NOTE — HISTORY OF PRESENT ILLNESS
[FreeTextEntry1] : 2023- Patient presents for FUV after a L3-4 LESI on 2023.  2023- The patient presents for initial evaluation regarding their low back pain.   Patient was referred by Dr. José.  Patient with longstanding history of chronic low back pain which she attributes to years of physical labor.  He reports about 2 months ago the patient was digging in the garden, and woke up the next day with excruciating lower back pain, and swelling.  There was associated ecchymosis in the low back with radiation into the left buttocks.  His pain is in the left side of his lower back with radicular pain shooting down his entire left leg into the foot and toes; he has done no conservative therapy since the flare-up but has done PT in the past for his lower back.  Injections: 1) L3-4 LESI (2023)  Pertinent Surgical History:  1) Left THR  Imagin) MRI Lumbar Spine (2023) - [facility]  Findings: There is dextrolevoscoliosis. There are postoperative changes at the left hip. There is exaggerated  mid-to-lower lumbar lordosis. There is anterolisthesis measuring 6-7 mm at L5-S1 and slight retrolisthesis at  L2-L3, L3-L4, and L4-L5. There is multilevel disc desiccation, loss of disc height, and scattered degenerative  endplate changes with anterior spondylosis most prominent on the left anteriorly at L2-L3 and on the right  anteriorly at L4-L5. There is no acute vertebral body fracture. The conus appears unremarkable. There are no  gross paravertebral soft tissue masses. L1-L2: There is diffuse disc bulging, bony ridging, and prominent facet hypertrophy posteriorly contributing to  mild central stenosis in the posterior aspect of the canal centrally with mild bilateral foraminal narrowing. L2-L3: There is disc bulging, bony ridging, facet arthrosis, broad-based posterior disc herniation, moderate  central stenosis, and bilateral exiting nerve root impingement with severe left foraminal narrowing. L3-L4: There is disc bulging, bony ridging, facet arthrosis, broad-based posterior disc herniation, right L4 nerve  root impingement, and right greater than left exiting L3 nerve root impingement. L4-L5: There is disc bulging, bony ridging, facet arthrosis, broad-based posterior disc herniation, moderate  central stenosis, impingement upon bilateral L5 nerve roots right greater than left, and impingement upon right greater than left exiting L4 nerve roots with severe right foraminal narrowing. L5-S1: There is disc bulging, bony ridging, facet arthrosis, and impingement upon the right exiting L5 nerve root with right greater than left foraminal narrowing.    Physician Disclaimer: I have personally reviewed and confirmed all HPI data with the patient.

## 2023-09-08 NOTE — HISTORY OF PRESENT ILLNESS
[FreeTextEntry1] : 2023- Patient presents for FUV after a L3-4 LESI on 2023, 2023 appointment was missed.   2023- The patient presents for initial evaluation regarding their low back pain.   Patient was referred by Dr. José.  Patient with longstanding history of chronic low back pain which she attributes to years of physical labor.  He reports about 2 months ago the patient was digging in the garden, and woke up the next day with excruciating lower back pain, and swelling.  There was associated ecchymosis in the low back with radiation into the left buttocks.  His pain is in the left side of his lower back with radicular pain shooting down his entire left leg into the foot and toes; he has done no conservative therapy since the flare-up but has done PT in the past for his lower back.  Injections: 1) L3-4 LESI (2023)  Pertinent Surgical History:  1) Left THR  Imagin) MRI Lumbar Spine (2023) - [facility]  Findings: There is dextrolevoscoliosis. There are postoperative changes at the left hip. There is exaggerated  mid-to-lower lumbar lordosis. There is anterolisthesis measuring 6-7 mm at L5-S1 and slight retrolisthesis at  L2-L3, L3-L4, and L4-L5. There is multilevel disc desiccation, loss of disc height, and scattered degenerative  endplate changes with anterior spondylosis most prominent on the left anteriorly at L2-L3 and on the right  anteriorly at L4-L5. There is no acute vertebral body fracture. The conus appears unremarkable. There are no  gross paravertebral soft tissue masses. L1-L2: There is diffuse disc bulging, bony ridging, and prominent facet hypertrophy posteriorly contributing to  mild central stenosis in the posterior aspect of the canal centrally with mild bilateral foraminal narrowing. L2-L3: There is disc bulging, bony ridging, facet arthrosis, broad-based posterior disc herniation, moderate  central stenosis, and bilateral exiting nerve root impingement with severe left foraminal narrowing. L3-L4: There is disc bulging, bony ridging, facet arthrosis, broad-based posterior disc herniation, right L4 nerve  root impingement, and right greater than left exiting L3 nerve root impingement. L4-L5: There is disc bulging, bony ridging, facet arthrosis, broad-based posterior disc herniation, moderate  central stenosis, impingement upon bilateral L5 nerve roots right greater than left, and impingement upon right greater than left exiting L4 nerve roots with severe right foraminal narrowing. L5-S1: There is disc bulging, bony ridging, facet arthrosis, and impingement upon the right exiting L5 nerve root with right greater than left foraminal narrowing.    Physician Disclaimer: I have personally reviewed and confirmed all HPI data with the patient.

## 2023-09-08 NOTE — PHYSICAL EXAM
[de-identified] : Constitutional:  \par  - No acute distress  \par  - Well developed; well nourished  \par  \par  Neurological:  \par  - normal mood and affect  \par  - alert and oriented x 3   \par  \par  Cardiovascular:  \par  - grossly normal \par  \par  Lumbar Spine Exam: \par  \par  Inspection: \par  erythema (-) \par  ecchymosis (-) \par  rashes (-) \par  alignment: Lumbar scoliosis \par  \par  Palpation: \par  Midline lumbar tenderness:            (-) \par  midline thoracic tenderness:          (-) \par  Lumbar paraspinal tenderness:  L (-) ; R (-) \par  thoracic paraspinal tenderness: L (-) ; R (-) \par  sciatic nerve tenderness :          L (+) ; R (-) \par  SI joint tenderness:                     L (+) ; R (-) \par  GTB tenderness:                        L (-);  R (-) \par  \par  ROM: WNL \par  pain with extremes of extension\par  \par  Strength: \par                                     Right       Left    \par  Hip Flexion:                (5/5)       (5/5) \par  Quadriceps:               (5/5)       (5/5) \par  Hamstrings:                (5/5)       (5/5) \par  Ankle Dorsiflexion:     (5/5)       (5/5) \par  EHL:                           (5/5)       (5/5) \par  Ankle Plantarflexion:  (5/5)       (5/5) \par  \par  Special Tests: \par  SLR:                            R (+) ; L (-) \par  Facet loading:             R (+) ; L (+) \par  LEX test:                R (-) ; L (N/A) \par  Hamstring tightness:   R (-);  L (-) \par  \par  Neurologic: \par  SILT throughout right lower extremity \par  SILT throughout left lower extremity \par  \par  Reflexes normal and symmetric bilateral lower extremities \par  \par  Gait: \par  Mildly antalgic gait

## 2023-09-08 NOTE — PHYSICAL EXAM
[de-identified] : Constitutional:  \par  - No acute distress  \par  - Well developed; well nourished  \par  \par  Neurological:  \par  - normal mood and affect  \par  - alert and oriented x 3   \par  \par  Cardiovascular:  \par  - grossly normal \par  \par  Lumbar Spine Exam: \par  \par  Inspection: \par  erythema (-) \par  ecchymosis (-) \par  rashes (-) \par  alignment: Lumbar scoliosis \par  \par  Palpation: \par  Midline lumbar tenderness:            (-) \par  midline thoracic tenderness:          (-) \par  Lumbar paraspinal tenderness:  L (-) ; R (-) \par  thoracic paraspinal tenderness: L (-) ; R (-) \par  sciatic nerve tenderness :          L (+) ; R (-) \par  SI joint tenderness:                     L (+) ; R (-) \par  GTB tenderness:                        L (-);  R (-) \par  \par  ROM: WNL \par  pain with extremes of extension\par  \par  Strength: \par                                     Right       Left    \par  Hip Flexion:                (5/5)       (5/5) \par  Quadriceps:               (5/5)       (5/5) \par  Hamstrings:                (5/5)       (5/5) \par  Ankle Dorsiflexion:     (5/5)       (5/5) \par  EHL:                           (5/5)       (5/5) \par  Ankle Plantarflexion:  (5/5)       (5/5) \par  \par  Special Tests: \par  SLR:                            R (+) ; L (-) \par  Facet loading:             R (+) ; L (+) \par  LEX test:                R (-) ; L (N/A) \par  Hamstring tightness:   R (-);  L (-) \par  \par  Neurologic: \par  SILT throughout right lower extremity \par  SILT throughout left lower extremity \par  \par  Reflexes normal and symmetric bilateral lower extremities \par  \par  Gait: \par  Mildly antalgic gait

## 2023-09-11 ENCOUNTER — APPOINTMENT (OUTPATIENT)
Dept: PAIN MANAGEMENT | Facility: CLINIC | Age: 78
End: 2023-09-11
Payer: MEDICARE

## 2023-09-11 VITALS — BODY MASS INDEX: 26.24 KG/M2 | HEIGHT: 73 IN | WEIGHT: 198 LBS

## 2023-09-11 DIAGNOSIS — M51.36 OTHER INTERVERTEBRAL DISC DEGENERATION, LUMBAR REGION: ICD-10-CM

## 2023-09-11 DIAGNOSIS — M41.9 SCOLIOSIS, UNSPECIFIED: ICD-10-CM

## 2023-09-11 DIAGNOSIS — M54.16 RADICULOPATHY, LUMBAR REGION: ICD-10-CM

## 2023-09-11 DIAGNOSIS — M48.061 SPINAL STENOSIS, LUMBAR REGION WITHOUT NEUROGENIC CLAUDICATION: ICD-10-CM

## 2023-09-11 PROCEDURE — 99214 OFFICE O/P EST MOD 30 MIN: CPT

## 2023-09-20 ENCOUNTER — RX RENEWAL (OUTPATIENT)
Age: 78
End: 2023-09-20

## 2023-09-20 RX ORDER — MELOXICAM 15 MG/1
15 TABLET ORAL
Qty: 30 | Refills: 2 | Status: ACTIVE | COMMUNITY
Start: 2023-03-23 | End: 1900-01-01

## 2023-12-01 ENCOUNTER — APPOINTMENT (OUTPATIENT)
Dept: ORTHOPEDIC SURGERY | Facility: CLINIC | Age: 78
End: 2023-12-01
Payer: MEDICARE

## 2023-12-01 PROCEDURE — 73564 X-RAY EXAM KNEE 4 OR MORE: CPT | Mod: 50

## 2023-12-01 PROCEDURE — 99213 OFFICE O/P EST LOW 20 MIN: CPT | Mod: 25

## 2023-12-01 PROCEDURE — 20611 DRAIN/INJ JOINT/BURSA W/US: CPT | Mod: 50

## 2023-12-08 ENCOUNTER — APPOINTMENT (OUTPATIENT)
Dept: ORTHOPEDIC SURGERY | Facility: CLINIC | Age: 78
End: 2023-12-08
Payer: MEDICARE

## 2023-12-08 VITALS — BODY MASS INDEX: 26.24 KG/M2 | HEIGHT: 73 IN | WEIGHT: 198 LBS

## 2023-12-08 PROCEDURE — 99212 OFFICE O/P EST SF 10 MIN: CPT | Mod: 25

## 2023-12-08 PROCEDURE — 20611 DRAIN/INJ JOINT/BURSA W/US: CPT | Mod: 50

## 2023-12-15 ENCOUNTER — APPOINTMENT (OUTPATIENT)
Dept: ORTHOPEDIC SURGERY | Facility: CLINIC | Age: 78
End: 2023-12-15
Payer: MEDICARE

## 2023-12-15 VITALS — WEIGHT: 198 LBS | HEIGHT: 73 IN | BODY MASS INDEX: 26.24 KG/M2

## 2023-12-15 PROCEDURE — 20611 DRAIN/INJ JOINT/BURSA W/US: CPT | Mod: 50

## 2023-12-15 NOTE — REVIEW OF SYSTEMS
[Joint Pain] : joint pain [Negative] : Heme/Lymph [Muscle Weakness] : muscle weakness [de-identified] : rt knee

## 2023-12-15 NOTE — PHYSICAL EXAM
[Bilateral] : knee bilaterally [Negative] : negative Joe's [] : no effusion [Right] : right knee [All Views] : anteroposterior, lateral, skyline, and anteroposterior standing [advanced tricompartmental OA with medial compartment narrowing and varus alignment] : advanced tricompartmental OA with medial compartment narrowing and varus alignment [FreeTextEntry9] : R > L [TWNoteComboBox7] : flexion 120 degrees

## 2023-12-15 NOTE — PROCEDURE
[FreeTextEntry3] : Euflexxa (Large Joint) with Ultrasound Guidance Viscosupplementation Injection: X-ray evidence of Osteoarthritis on this or prior visit and Patient has tried OTC's including aspirin, Ibuprofen, Aleve etc or prescription NSAIDS, and/or exercises at home and/ or physical therapy without satisfactory response.  An injection of Euflexxa 2ml #2 was injected into the bilat knee(s). after verbal consent using sterile technique. The risks, benefits, and alternatives to Viscosupplementation injection were explained in full to the patient. Risks outlined include but are not limited to infection, sepsis, bleeding, scarring, skin discoloration, temporary increase in pain, syncopal episode, failure to resolve symptoms, allergic reaction, and symptom recurrence. Signs and symptoms of infection reviewed and patient advised to call immediately for redness, fevers, and/or chills. Patient understood the risks. All questions were answered. After discussion of options, patient requested Viscosupplementation. Oral informed consent was obtained and sterile prep was done of the injection site. Sterile technique was used without complications. The patient tolerated the procedure well. Ice tonight to the injection site.   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   Ultrasound guided injection was performed of the knee, visualization of the needle and placement of injection was performed without complication

## 2023-12-15 NOTE — HISTORY OF PRESENT ILLNESS
[Localized] : localized [Injection therapy] : injection therapy [2] : 2 [Euflexxa] : Euflexxa [de-identified] : Patient returns for Euflexxa #2 both knees, he has completed in the past with help.  [] : no [FreeTextEntry1] : knees [FreeTextEntry5] : rt knee pain level is a 3 at times and l knee is a 1 [de-identified] : 12-8-23 [de-identified] : knees

## 2023-12-22 ENCOUNTER — APPOINTMENT (OUTPATIENT)
Dept: ORTHOPEDIC SURGERY | Facility: CLINIC | Age: 78
End: 2023-12-22
Payer: MEDICARE

## 2023-12-22 DIAGNOSIS — M17.11 UNILATERAL PRIMARY OSTEOARTHRITIS, RIGHT KNEE: ICD-10-CM

## 2023-12-22 DIAGNOSIS — M17.12 UNILATERAL PRIMARY OSTEOARTHRITIS, LEFT KNEE: ICD-10-CM

## 2023-12-22 PROCEDURE — 20611 DRAIN/INJ JOINT/BURSA W/US: CPT | Mod: LT

## 2023-12-22 NOTE — DISCUSSION/SUMMARY
[de-identified] : rest, ice, activity modification.    Frequency of visco and csi reviewed. Return in 6 weeks as needed.

## 2023-12-22 NOTE — REVIEW OF SYSTEMS
[Joint Pain] : joint pain [Muscle Weakness] : muscle weakness [Negative] : Heme/Lymph [de-identified] : rt knee

## 2023-12-22 NOTE — PROCEDURE
[FreeTextEntry3] : Euflexxa (Large Joint) with Ultrasound Guidance Viscosupplementation Injection: X-ray evidence of Osteoarthritis on this or prior visit and Patient has tried OTC's including aspirin, Ibuprofen, Aleve etc or prescription NSAIDS, and/or exercises at home and/ or physical therapy without satisfactory response.  An injection of Euflexxa 2ml #3 was injected into the bilat knee(s). after verbal consent using sterile technique. The risks, benefits, and alternatives to Viscosupplementation injection were explained in full to the patient. Risks outlined include but are not limited to infection, sepsis, bleeding, scarring, skin discoloration, temporary increase in pain, syncopal episode, failure to resolve symptoms, allergic reaction, and symptom recurrence. Signs and symptoms of infection reviewed and patient advised to call immediately for redness, fevers, and/or chills. Patient understood the risks. All questions were answered. After discussion of options, patient requested Viscosupplementation. Oral informed consent was obtained and sterile prep was done of the injection site. Sterile technique was used without complications. The patient tolerated the procedure well. Ice tonight to the injection site.   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   Ultrasound guided injection was performed of the knee, visualization of the needle and placement of injection was performed without complication

## 2024-07-23 ENCOUNTER — APPOINTMENT (OUTPATIENT)
Dept: NEUROLOGY | Facility: CLINIC | Age: 79
End: 2024-07-23

## 2024-07-26 ENCOUNTER — APPOINTMENT (OUTPATIENT)
Dept: ORTHOPEDIC SURGERY | Facility: CLINIC | Age: 79
End: 2024-07-26
Payer: MEDICARE

## 2024-07-26 VITALS — WEIGHT: 198 LBS | HEIGHT: 73 IN | BODY MASS INDEX: 26.24 KG/M2

## 2024-07-26 DIAGNOSIS — Z86.73 PERSONAL HISTORY OF TRANSIENT ISCHEMIC ATTACK (TIA), AND CEREBRAL INFARCTION W/OUT RESIDUAL DEFICITS: ICD-10-CM

## 2024-07-26 PROCEDURE — 99213 OFFICE O/P EST LOW 20 MIN: CPT | Mod: 25

## 2024-07-26 PROCEDURE — 20611 DRAIN/INJ JOINT/BURSA W/US: CPT | Mod: 50

## 2024-07-26 NOTE — HISTORY OF PRESENT ILLNESS
[Injection therapy] : injection therapy [Localized] : localized [de-identified] : Patient returns for both knees, history of OA, pain with walking, stiffness, stairs. Since last visit had fall and injured his back, passed out and had intracranial bleed which he is being followed for , completed HA Dec 2023 with help until recently [] : no [FreeTextEntry1] : knees

## 2024-07-26 NOTE — DISCUSSION/SUMMARY
[de-identified] : rest, ice, activity modification.   07/26/2024  RE:  TRISTAN SMITH   [facility] #- [id number]  Attention:  Nurse Reviewer /Medical Director  I am writing this letter as a medical necessity for HA euflexxa both knees Patient has tried analgesics, non-steroid anti-inflammatory agents,  physical therapy, hot or cold compresses,injections of corticosteroids, etc)  which in combination or by themselves has not worked. Based on my patient's condition, I strongly believe that the Hyaluronic aid injections is medically needed.   Thank you for your time and consideration.

## 2024-07-26 NOTE — PHYSICAL EXAM
[Bilateral] : knee bilaterally [Negative] : negative Joe's [] : negative Lachmann [TWNoteComboBox7] : flexion 120 degrees

## 2024-08-02 ENCOUNTER — APPOINTMENT (OUTPATIENT)
Dept: ORTHOPEDIC SURGERY | Facility: CLINIC | Age: 79
End: 2024-08-02
Payer: MEDICARE

## 2024-08-02 PROCEDURE — 20611 DRAIN/INJ JOINT/BURSA W/US: CPT | Mod: 50

## 2024-08-02 PROCEDURE — 99212 OFFICE O/P EST SF 10 MIN: CPT | Mod: 25

## 2024-08-02 NOTE — DISCUSSION/SUMMARY
[de-identified] : rest, ice, activity modification.   08/02/2024  RE:  TRISTAN SMITH   [facility] #- [id number]  Attention:  Nurse Reviewer /Medical Director  I am writing this letter as a medical necessity for HA euflexxa Patient has tried analgesics, non-steroid anti-inflammatory agents,  physical therapy, hot or cold compresses,injections of corticosteroids, etc)  which in combination or by themselves has not worked. Based on my patient's condition, I strongly believe that the Hyaluronic aid injections is medically needed.   Thank you for your time and consideration.

## 2024-08-02 NOTE — REASON FOR VISIT
Rx faxed to [facility] in  on [address].  Sandy Andujar       [Spouse] : spouse [FreeTextEntry2] : bilat knee pain, R > L, recurred

## 2024-08-02 NOTE — PROCEDURE
[FreeTextEntry3] : Euflexxa (Large Joint) with Ultrasound Guidance Viscosupplementation Injection: X-ray evidence of Osteoarthritis on this or prior visit and Patient has tried OTC's including aspirin, Ibuprofen, Aleve etc or prescription NSAIDS, and/or exercises at home and/ or physical therapy without satisfactory response.  An injection of Euflexxa 2ml  #1 was injected into the bilat knee(s). after verbal consent using sterile technique. The risks, benefits, and alternatives to Viscosupplementation injection were explained in full to the patient. Risks outlined include but are not limited to infection, sepsis, bleeding, scarring, skin discoloration, temporary increase in pain, syncopal episode, failure to resolve symptoms, allergic reaction, and symptom recurrence. Signs and symptoms of infection reviewed and patient advised to call immediately for redness, fevers, and/or chills. Patient understood the risks. All questions were answered. After discussion of options, patient requested Viscosupplementation. Oral informed consent was obtained and sterile prep was done of the injection site. Sterile technique was used without complications. The patient tolerated the procedure well. Ice tonight to the injection site.   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   Ultrasound guided injection was performed of the knee, visualization of the needle and placement of injection was performed without complication

## 2024-08-02 NOTE — HISTORY OF PRESENT ILLNESS
[de-identified] : Patient has persistent symptoms in both knees, tolerated Zilretta well, considering Euflexxa series. He has completed in the past with help.

## 2024-08-09 ENCOUNTER — APPOINTMENT (OUTPATIENT)
Dept: ORTHOPEDIC SURGERY | Facility: CLINIC | Age: 79
End: 2024-08-09

## 2024-08-09 PROCEDURE — 20611 DRAIN/INJ JOINT/BURSA W/US: CPT | Mod: 50

## 2024-08-09 NOTE — HISTORY OF PRESENT ILLNESS
[Localized] : localized [Injection therapy] : injection therapy [2] : 2 [Euflexxa] : Euflexxa [de-identified] : Patient has persistent symptoms in both knees, has oA and Euflexxa series.  [] : no [FreeTextEntry1] : knees [de-identified] : 8-2-24 [de-identified] : knees

## 2024-08-09 NOTE — PROCEDURE
[FreeTextEntry3] : Euflexxa (Large Joint) with Ultrasound Guidance Viscosupplementation Injection: X-ray evidence of Osteoarthritis on this or prior visit and Patient has tried OTC's including aspirin, Ibuprofen, Aleve etc or prescription NSAIDS, and/or exercises at home and/ or physical therapy without satisfactory response.  An injection of Euflexxa 2ml  #2 was injected into the bilat knee(s). after verbal consent using sterile technique. The risks, benefits, and alternatives to Viscosupplementation injection were explained in full to the patient. Risks outlined include but are not limited to infection, sepsis, bleeding, scarring, skin discoloration, temporary increase in pain, syncopal episode, failure to resolve symptoms, allergic reaction, and symptom recurrence. Signs and symptoms of infection reviewed and patient advised to call immediately for redness, fevers, and/or chills. Patient understood the risks. All questions were answered. After discussion of options, patient requested Viscosupplementation. Oral informed consent was obtained and sterile prep was done of the injection site. Sterile technique was used without complications. The patient tolerated the procedure well. Ice tonight to the injection site.   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   Ultrasound guided injection was performed of the knee, visualization of the needle and placement of injection was performed without complication  Take Colace (an over-the-counter drug) 100mg three times a day to prevent/help with constipation while taking pain killer. If no bowel movement in 3 days, take Miralax.

## 2024-08-09 NOTE — DISCUSSION/SUMMARY
[de-identified] : rest, ice, activity modification.   08/02/2024  RE:  TRISTAN SMITH   [facility] #- [id number]  Attention:  Nurse Reviewer /Medical Director  I am writing this letter as a medical necessity for HA euflexxa Patient has tried analgesics, non-steroid anti-inflammatory agents,  physical therapy, hot or cold compresses,injections of corticosteroids, etc)  which in combination or by themselves has not worked. Based on my patient's condition, I strongly believe that the Hyaluronic aid injections is medically needed.   Thank you for your time and consideration.

## 2024-08-16 ENCOUNTER — APPOINTMENT (OUTPATIENT)
Dept: ORTHOPEDIC SURGERY | Facility: CLINIC | Age: 79
End: 2024-08-16
Payer: MEDICARE

## 2024-08-16 VITALS — HEIGHT: 73 IN | WEIGHT: 198 LBS | BODY MASS INDEX: 26.24 KG/M2

## 2024-08-16 DIAGNOSIS — M17.12 UNILATERAL PRIMARY OSTEOARTHRITIS, LEFT KNEE: ICD-10-CM

## 2024-08-16 DIAGNOSIS — M17.11 UNILATERAL PRIMARY OSTEOARTHRITIS, RIGHT KNEE: ICD-10-CM

## 2024-08-16 PROCEDURE — 20611 DRAIN/INJ JOINT/BURSA W/US: CPT | Mod: 50

## 2024-08-16 NOTE — DISCUSSION/SUMMARY
[de-identified] : rest, ice, activity modification.   Frequency of visco and csi reviewed. Return in 6 weeks as needed.

## 2025-02-28 ENCOUNTER — APPOINTMENT (OUTPATIENT)
Dept: ORTHOPEDIC SURGERY | Facility: CLINIC | Age: 80
End: 2025-02-28
Payer: MEDICARE

## 2025-02-28 VITALS — BODY MASS INDEX: 26.24 KG/M2 | HEIGHT: 73 IN | WEIGHT: 198 LBS

## 2025-02-28 PROCEDURE — 20611 DRAIN/INJ JOINT/BURSA W/US: CPT | Mod: 50

## 2025-02-28 PROCEDURE — 99213 OFFICE O/P EST LOW 20 MIN: CPT | Mod: 25

## 2025-03-07 ENCOUNTER — APPOINTMENT (OUTPATIENT)
Dept: ORTHOPEDIC SURGERY | Facility: CLINIC | Age: 80
End: 2025-03-07
Payer: MEDICARE

## 2025-03-07 VITALS — BODY MASS INDEX: 26.24 KG/M2 | HEIGHT: 73 IN | WEIGHT: 198 LBS

## 2025-03-07 DIAGNOSIS — M17.11 UNILATERAL PRIMARY OSTEOARTHRITIS, RIGHT KNEE: ICD-10-CM

## 2025-03-07 DIAGNOSIS — M17.12 UNILATERAL PRIMARY OSTEOARTHRITIS, LEFT KNEE: ICD-10-CM

## 2025-03-07 PROCEDURE — 99213 OFFICE O/P EST LOW 20 MIN: CPT | Mod: 25

## 2025-03-07 PROCEDURE — 20611 DRAIN/INJ JOINT/BURSA W/US: CPT | Mod: 50

## 2025-03-21 ENCOUNTER — APPOINTMENT (OUTPATIENT)
Dept: ORTHOPEDIC SURGERY | Facility: CLINIC | Age: 80
End: 2025-03-21
Payer: MEDICARE

## 2025-03-21 VITALS — WEIGHT: 198 LBS | BODY MASS INDEX: 26.24 KG/M2 | HEIGHT: 73 IN

## 2025-03-21 DIAGNOSIS — M17.12 UNILATERAL PRIMARY OSTEOARTHRITIS, LEFT KNEE: ICD-10-CM

## 2025-03-21 DIAGNOSIS — M17.11 UNILATERAL PRIMARY OSTEOARTHRITIS, RIGHT KNEE: ICD-10-CM

## 2025-03-21 PROCEDURE — 20611 DRAIN/INJ JOINT/BURSA W/US: CPT | Mod: 50

## 2025-04-04 ENCOUNTER — APPOINTMENT (OUTPATIENT)
Dept: ORTHOPEDIC SURGERY | Facility: CLINIC | Age: 80
End: 2025-04-04
Payer: MEDICARE

## 2025-04-04 VITALS — WEIGHT: 198 LBS | HEIGHT: 73 IN | BODY MASS INDEX: 26.24 KG/M2

## 2025-04-04 DIAGNOSIS — M17.11 UNILATERAL PRIMARY OSTEOARTHRITIS, RIGHT KNEE: ICD-10-CM

## 2025-04-04 DIAGNOSIS — M17.12 UNILATERAL PRIMARY OSTEOARTHRITIS, LEFT KNEE: ICD-10-CM

## 2025-04-04 PROCEDURE — 20611 DRAIN/INJ JOINT/BURSA W/US: CPT | Mod: 50

## 2025-07-06 NOTE — HISTORY OF PRESENT ILLNESS
Continue PTA gabapentin, Flexeril, Percocet  PDMP reviewed, no red flags   [de-identified] : Patient returns for Euflexxa #3 both knees, he has completed in the past with help.